# Patient Record
Sex: FEMALE | Race: WHITE | NOT HISPANIC OR LATINO | Employment: FULL TIME | URBAN - METROPOLITAN AREA
[De-identification: names, ages, dates, MRNs, and addresses within clinical notes are randomized per-mention and may not be internally consistent; named-entity substitution may affect disease eponyms.]

---

## 2023-03-10 ENCOUNTER — TELEPHONE (OUTPATIENT)
Dept: PRIMARY CARE | Facility: CLINIC | Age: 28
End: 2023-03-10
Payer: COMMERCIAL

## 2023-03-10 NOTE — TELEPHONE ENCOUNTER
Pt informed form was faxed to he dr.  Pt informed Dr performing procedure will know which atb she should receive.

## 2023-03-10 NOTE — TELEPHONE ENCOUNTER
Pt. States she dropped off a form on Wed. To be looked over and signed by Dr. MANN. She is having her wisdom teeth pulled and the dentist requires the PCP tell them what pt. Can take after the procedure. E.g. IBP  They also need it to say that pt. Is not allergic to Amox. Pt. States she told the nurse that she get gi upset when she takes Amox, and it got recorded in her chart that she allergic.

## 2023-10-20 PROBLEM — R76.8 HEPATITIS B CORE ANTIBODY POSITIVE: Status: ACTIVE | Noted: 2023-10-20

## 2023-10-20 PROBLEM — L02.92 BOIL: Status: RESOLVED | Noted: 2023-10-20 | Resolved: 2023-10-20

## 2023-10-20 PROBLEM — R53.83 FATIGUE: Status: ACTIVE | Noted: 2023-10-20

## 2023-10-20 PROBLEM — L70.0 ACNE VULGARIS: Status: ACTIVE | Noted: 2023-10-20

## 2023-10-20 PROBLEM — R10.9 ABDOMINAL PAIN: Status: RESOLVED | Noted: 2023-10-20 | Resolved: 2023-10-20

## 2023-10-20 PROBLEM — D64.9 ANEMIA: Status: ACTIVE | Noted: 2023-10-20

## 2023-10-20 PROBLEM — B18.2 CHRONIC HEPATITIS C WITHOUT HEPATIC COMA (MULTI): Status: ACTIVE | Noted: 2023-10-20

## 2023-10-20 PROBLEM — N92.1 BREAKTHROUGH BLEEDING WITH IUD: Status: ACTIVE | Noted: 2023-10-20

## 2023-10-20 PROBLEM — K92.1 HEMATOCHEZIA: Status: ACTIVE | Noted: 2023-10-20

## 2023-10-20 PROBLEM — K52.9 COLITIS: Status: RESOLVED | Noted: 2023-10-20 | Resolved: 2023-10-20

## 2023-10-20 PROBLEM — B19.10: Status: ACTIVE | Noted: 2023-10-20

## 2023-10-20 PROBLEM — T18.9XXA INGESTION OF FOREIGN MATERIAL: Status: RESOLVED | Noted: 2023-10-20 | Resolved: 2023-10-20

## 2023-10-20 PROBLEM — Z86.19 HEPATITIS C VIRUS INFECTION RESOLVED AFTER ANTIVIRAL DRUG THERAPY: Status: ACTIVE | Noted: 2023-10-20

## 2023-10-20 PROBLEM — Z97.5 BREAKTHROUGH BLEEDING WITH IUD: Status: ACTIVE | Noted: 2023-10-20

## 2023-10-20 PROBLEM — B18.1 CHRONIC VIRAL HEPATITIS B (MULTI): Status: ACTIVE | Noted: 2023-10-20

## 2023-10-20 PROBLEM — T18.9XXA FOREIGN BODY IN DIGESTIVE TRACT: Status: RESOLVED | Noted: 2023-10-20 | Resolved: 2023-10-20

## 2023-10-20 PROBLEM — N92.0 MENORRHAGIA: Status: ACTIVE | Noted: 2023-10-20

## 2023-10-20 RX ORDER — DICYCLOMINE HYDROCHLORIDE 10 MG/1
1 CAPSULE ORAL
COMMUNITY
Start: 2023-01-28

## 2023-10-20 RX ORDER — SPIRONOLACTONE 100 MG/1
100 TABLET, FILM COATED ORAL DAILY
COMMUNITY
End: 2023-10-24 | Stop reason: ALTCHOICE

## 2023-10-20 RX ORDER — MULTIVITAMIN
TABLET ORAL
COMMUNITY
Start: 2022-04-20

## 2023-10-20 RX ORDER — LEVONORGESTREL 52 MG/1
INTRAUTERINE DEVICE INTRAUTERINE
COMMUNITY
Start: 2022-04-13

## 2023-10-24 ENCOUNTER — OFFICE VISIT (OUTPATIENT)
Dept: PRIMARY CARE | Facility: CLINIC | Age: 28
End: 2023-10-24
Payer: COMMERCIAL

## 2023-10-24 VITALS
BODY MASS INDEX: 22.37 KG/M2 | OXYGEN SATURATION: 96 % | HEIGHT: 70 IN | SYSTOLIC BLOOD PRESSURE: 115 MMHG | DIASTOLIC BLOOD PRESSURE: 75 MMHG | WEIGHT: 156.25 LBS | TEMPERATURE: 98.1 F | HEART RATE: 76 BPM

## 2023-10-24 DIAGNOSIS — Q89.2 THYROGLOSSAL DUCT CYST: Primary | ICD-10-CM

## 2023-10-24 PROCEDURE — 99213 OFFICE O/P EST LOW 20 MIN: CPT | Performed by: FAMILY MEDICINE

## 2023-10-24 PROCEDURE — 1036F TOBACCO NON-USER: CPT | Performed by: FAMILY MEDICINE

## 2023-10-24 ASSESSMENT — ENCOUNTER SYMPTOMS: SORE THROAT: 0

## 2023-10-24 NOTE — PROGRESS NOTES
"Subjective   Patient ID: Lelo Arguelles is a 28 y.o. female who presents for small lump on the outside of her throat. States she noticed this in the pst but never thought much of it; since it seems to grow in size periodically pt wanted to have it examined.       On off in past   A little smaller   No painful           Review of Systems   HENT:  Negative for sore throat.        Objective   /75   Pulse 76   Temp 36.7 °C (98.1 °F)   Ht 1.778 m (5' 10\")   Wt 70.9 kg (156 lb 4 oz)   SpO2 96%   BMI 22.42 kg/m²     Physical Exam  Constitutional:       Appearance: Normal appearance.   Neck:      Comments: Thyroid midline not enlarged    Small 1 cm round soft nodule midline, inferior to the thyroid, easily mobile  Musculoskeletal:      Cervical back: Normal range of motion and neck supple. No tenderness.   Lymphadenopathy:      Cervical: No cervical adenopathy.   Neurological:      Mental Status: She is alert.   Psychiatric:         Mood and Affect: Mood normal.         Behavior: Behavior normal.         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

## 2024-03-05 ENCOUNTER — OFFICE VISIT (OUTPATIENT)
Dept: OTOLARYNGOLOGY | Facility: CLINIC | Age: 29
End: 2024-03-05
Payer: COMMERCIAL

## 2024-03-05 VITALS — BODY MASS INDEX: 22.48 KG/M2 | WEIGHT: 157 LBS | HEIGHT: 70 IN | TEMPERATURE: 97.1 F

## 2024-03-05 DIAGNOSIS — Q89.2 THYROGLOSSAL DUCT CYST: ICD-10-CM

## 2024-03-05 DIAGNOSIS — R22.1 NECK MASS: Primary | ICD-10-CM

## 2024-03-05 PROCEDURE — 99203 OFFICE O/P NEW LOW 30 MIN: CPT | Performed by: OTOLARYNGOLOGY

## 2024-03-05 PROCEDURE — 1036F TOBACCO NON-USER: CPT | Performed by: OTOLARYNGOLOGY

## 2024-03-05 ASSESSMENT — PATIENT HEALTH QUESTIONNAIRE - PHQ9
2. FEELING DOWN, DEPRESSED OR HOPELESS: NOT AT ALL
SUM OF ALL RESPONSES TO PHQ9 QUESTIONS 1 & 2: 0
1. LITTLE INTEREST OR PLEASURE IN DOING THINGS: NOT AT ALL

## 2024-03-06 NOTE — PROGRESS NOTES
BENNY Arguelles is a 29 y.o. female Michelle referred for midline neck mass which has been fluctuating in size from small like a BB to still less than a centimeter.  Nontender.  It is at its smallest now.  Present over approximately the inferior thyroid cartilage.  She has not had any other throat symptoms or pain.      Past Medical History:   Diagnosis Date    Abdominal pain 10/20/2023    Acute vaginitis 05/28/2021    Acute vaginitis    Anogenital (venereal) warts 05/28/2021    Condyloma acuminatum in female    Atypical squamous cells of undetermined significance on cytologic smear of cervix (ASC-US) 02/21/2022    ASCUS with positive high risk HPV cervical    Boil 10/20/2023    Chronic viral hepatitis B without delta-agent (CMS/HCC) 06/04/2021    Chronic viral hepatitis B    Contact with and (suspected) exposure to infections with a predominantly sexual mode of transmission 05/28/2021    Exposure to STD    Encounter for pregnancy test, result negative     Pregnancy examination or test, negative result    Foreign body in digestive tract 10/20/2023    Local infection of the skin and subcutaneous tissue, unspecified 03/25/2019    Staph skin infection    Melena 04/04/2019    Blood in the stool    Other diseases of tongue 03/25/2019    Mucocele of tongue    Other specified abnormal findings of blood chemistry 04/15/2021    Abnormal LFTs (liver function tests)    Other specified symptoms and signs involving the digestive system and abdomen 04/04/2019    Alternating constipation and diarrhea    Personal history of diseases of the skin and subcutaneous tissue 03/25/2019    History of acne    Personal history of other diseases of the digestive system 04/15/2021    History of colitis    Personal history of other diseases of the digestive system 10/01/2019    History of esophagitis    Personal history of other infectious and parasitic diseases 03/12/2021    History of trichomoniasis    Personal history of other specified  "conditions 03/25/2019    History of diarrhea            Medications:     Current Outpatient Medications:     dicyclomine (Bentyl) 10 mg capsule, Take 1 capsule (10 mg) by mouth every 6 hours during the day., Disp: , Rfl:     levonorgestrel (Mirena) 21 mcg/24 hours (8 yrs) 52 mg IUD, Mirena (52 MG) 20 MCG/DAY Intrauterine Intrauterine Device  Refills: 0      Start : 13-Apr-2022  Active, Disp: , Rfl:     multivitamin tablet, Multivitamin Oral Tablet  Refills: 0      Start : 20-Apr-2022  Active, Disp: , Rfl:      Allergies:  No Known Allergies     Physical Exam:  Last Recorded Vitals  Temperature 36.2 °C (97.1 °F), height 1.778 m (5' 10\"), weight 71.2 kg (157 lb).  General:     General appearance: Well-developed, well-nourished in no acute distress.       Voice:  normal       Head/face: Normal appearance; nontender to palpation     Facial nerve function: Normal and symmetric bilaterally.    Oral/oropharynx:     Oral vestibule: Normal labial and gingival mucosa     Tongue/floor of mouth: Normal without lesion     Oropharynx: Clear.  No lesions present of the hard/soft palate, posterior pharynx    Neck:     Neck: Normal appearance, trachea midline     Salivary glands: Normal to palpation bilaterally     Lymph nodes: Tiny mobile mass in the midline superior to the cricoid but not superior to the hyoid.     Thyroid: No thyromegaly.  No palpable nodules     Range of motion: Normal    Neurological:     Cortical functions: Alert and oriented x3, appropriate affect       Larynx/hypopharynx:     Laryngeal findings: Mirror exam inadequate or limited secondary to enlarged base of tongue and/or excessive gagging.  Flexible laryngoscopy performed secondary to inadequate mirror examination.  Verbal informed consent the flexible laryngoscope was passed through the nasal cavity.  Normal epiglottis, aryepiglottic folds, arytenoids, false vocal cords, true vocal cords.  Normal vocal cord mobility bilaterally was identified.  No mucosal " masses or lesions.    Nasopharynx:     Nasopharynx: Normal    Ear:     Ear canal: Normal bilaterally     Tympanic membrane: Intact and mobile bilaterally     Pinna: Normal bilaterally     Hearing:  Gross hearing assessment normal by voice    Nose:     Visualized using: Anterior rhinoscopy     Nasopharynx: Inadequate mirror exam secondary to gag, anatomy.       Nasal dorsum: Nontraumatic midline appearance     Septum: Midline     Inferior turbinates: Normally sized     Mucosa: Bilateral, pink, normal appearing       Assessment/Plan   This is a fairly inferior midline mass.  It has characteristics which suggest to me that it is a lymph node.  I recommended an ultrasound to better evaluate and we will follow-up after.  It is too small for needle biopsy.  We may consider removal or simply observation         Anibal Hughes MD

## 2024-06-13 ENCOUNTER — OFFICE VISIT (OUTPATIENT)
Dept: OBSTETRICS AND GYNECOLOGY | Facility: CLINIC | Age: 29
End: 2024-06-13
Payer: COMMERCIAL

## 2024-06-13 VITALS
WEIGHT: 154 LBS | DIASTOLIC BLOOD PRESSURE: 66 MMHG | SYSTOLIC BLOOD PRESSURE: 100 MMHG | BODY MASS INDEX: 22.05 KG/M2 | HEIGHT: 70 IN

## 2024-06-13 DIAGNOSIS — N94.10 DYSPAREUNIA IN FEMALE: Primary | ICD-10-CM

## 2024-06-13 DIAGNOSIS — N92.6 IRREGULAR MENSTRUAL CYCLE: ICD-10-CM

## 2024-06-13 PROCEDURE — 99213 OFFICE O/P EST LOW 20 MIN: CPT | Performed by: OBSTETRICS & GYNECOLOGY

## 2024-06-13 PROCEDURE — 1036F TOBACCO NON-USER: CPT | Performed by: OBSTETRICS & GYNECOLOGY

## 2024-06-13 RX ORDER — CLINDAMYCIN PHOSPHATE 10 UG/ML
LOTION TOPICAL
COMMUNITY
Start: 2024-05-16

## 2024-06-13 NOTE — PROGRESS NOTES
Subjective   Patient ID: Lelo Arguelles is a 29 y.o. female who presents for Menstrual Problem.  HPI  She has a Mirena IUD that was inserted 3/2022.  Pretty regular cycles for the most part. Recently getting them a little earlier and lasting a little longer though.  LMP end of May, spotting this week. Gone now.    Mostly made appointment today because of recent episode of pain.  2 nights ago she got pain after intercourse, tight cramping and sharp pain in the center of the abdomen and pelvis that lasted for a few hours. Was better yesterday and basically gone today.   No bleeding after intercourse.  Same partner for 3 years.    No bowel or bladder symptoms.     Review of Systems   Genitourinary:  Positive for dyspareunia and menstrual problem.   All other systems reviewed and are negative.    Objective   Physical Exam  Constitutional: Alert and in no acute distress.     Pulmonary: No respiratory distress.     Abdomen: Soft, nontender; no abdominal mass palpated.     Genitourinary:   External genitalia: Normal appearance.  No inguinal lymphadenopathy.   Bartholin's, Urethral, and Skenes Glands: Normal.   Urethra: Normal. Bladder: Normal on palpation.   Vagina: Normal. No blood or discharge.  Cervix: Normal appearance, nontender. IUD strings are visible and correct length.  Uterus/Adnexa: Normal size, mobile uterus. Nontender, no masses palpated in adnexa  Inspection of perianal area: Normal.    Psychiatric: Alert and oriented x 3. Affect normal to patient's baseline. Mood: Appropriate.    Assessment/Plan   Diagnoses and all orders for this visit:  Dyspareunia in female  Irregular menstrual cycle     Isolated recent episode of pain with intercourse.  Discussed possible causes including an issue with her IUD, other abnormality of the uterus, ovarian/tubal abnormality or cyst, infection.  Reassured there that her exam is normal today; no pain with palpation and IUD appears in correct position based on strings.  Offered  possibility of ultrasound to assess for any changes, but for now she would like to wait since the pain has only happened once and gone now. If it returns she will let me know.  Some irregularity of the menstrual cycle is normal with the IUD. Also offered to have her get lab work, but will also wait on this for now.    Jelly Casarez MD 06/13/24 2:45 PM

## 2025-06-17 ENCOUNTER — OFFICE VISIT (OUTPATIENT)
Dept: PRIMARY CARE | Facility: CLINIC | Age: 30
End: 2025-06-17
Payer: COMMERCIAL

## 2025-06-17 VITALS
DIASTOLIC BLOOD PRESSURE: 70 MMHG | SYSTOLIC BLOOD PRESSURE: 102 MMHG | OXYGEN SATURATION: 100 % | BODY MASS INDEX: 20.87 KG/M2 | HEIGHT: 70 IN | WEIGHT: 145.8 LBS | HEART RATE: 75 BPM

## 2025-06-17 DIAGNOSIS — L23.7 POISON IVY DERMATITIS: Primary | ICD-10-CM

## 2025-06-17 DIAGNOSIS — L98.9 SKIN LESION: ICD-10-CM

## 2025-06-17 PROCEDURE — 3008F BODY MASS INDEX DOCD: CPT | Performed by: NURSE PRACTITIONER

## 2025-06-17 PROCEDURE — 1036F TOBACCO NON-USER: CPT | Performed by: NURSE PRACTITIONER

## 2025-06-17 PROCEDURE — 99213 OFFICE O/P EST LOW 20 MIN: CPT | Performed by: NURSE PRACTITIONER

## 2025-06-17 RX ORDER — PREDNISONE 10 MG/1
TABLET ORAL
Qty: 20 TABLET | Refills: 0 | Status: SHIPPED | OUTPATIENT
Start: 2025-06-17 | End: 2025-06-25

## 2025-06-17 ASSESSMENT — ENCOUNTER SYMPTOMS
COUGH: 0
RHINORRHEA: 0
DIARRHEA: 0
SHORTNESS OF BREATH: 0
FEVER: 0
NAIL CHANGES: 0
FATIGUE: 0
ROS SKIN COMMENTS: SEE HPI
SORE THROAT: 0
VOMITING: 0
EYE PAIN: 0
ANOREXIA: 0

## 2025-06-17 ASSESSMENT — PATIENT HEALTH QUESTIONNAIRE - PHQ9
1. LITTLE INTEREST OR PLEASURE IN DOING THINGS: NOT AT ALL
2. FEELING DOWN, DEPRESSED OR HOPELESS: NOT AT ALL
SUM OF ALL RESPONSES TO PHQ9 QUESTIONS 1 AND 2: 0

## 2025-06-17 NOTE — PROGRESS NOTES
Subjective   Patient ID: Lelo Arguelles is a 30 y.o. female who presents for Poison Ivy (Pt states started 2 days ago. It is by right eye going down ear and neck and noticed bumps and reddish on left side of face by forehead.) and sore lump (States happens once  a month and it always on the lower tail bone area. States she has one right now on top of left butt cheek).    Patient seen today due to acute skin concerns.  Patient states that she has a rash secondary to poison ivy which developed 2 to 3 days ago.  She states that she noticed her puppy was walking through a patch of poison ivy several days ago and although the dog was bathed, she feels like she got exposed.  Rash to right side of patient's face and near her eyebrow.  No current drainage but discomfort reported.  Patient states that she also feels some lesions in her hair and is worried about it continuing to spread.  Also discussed with patient intermittent lesions to her buttocks area.  She voices concerns over recurrent lesion that is near her tailbone.  She states that these lesions appear approximately once a month will flare, takes several weeks to improve and then return.  Patient reports that this has been going on for approximately 1 year.  She does currently follow with dermatology but states that they have never been able to catch an active flare.  She denies any current infection concerns, fever, chills or other systemic issues.  She denies any new lotions, detergents or clothing.  Medications reviewed.  No other acute concerns voiced at this time.    Rash  This is a new problem. The current episode started in the past 7 days. The problem has been gradually worsening since onset. The affected locations include the scalp, head, face and left buttock. The rash is characterized by burning, redness and itchiness. It is plant contact and unknown if there was an exposure to a precipitant. She was exposed to plant contact and unknown. Pertinent  "negatives include no anorexia, congestion, cough, diarrhea, eye pain, facial edema, fatigue, fever, joint pain, nail changes, rhinorrhea, shortness of breath, sore throat or vomiting.   Poison Fernanda  Pertinent negatives include no anorexia, congestion, cough, diarrhea, eye pain, facial edema, fatigue, fever, joint pain, nail changes, rhinorrhea, shortness of breath, sore throat or vomiting.            Medications Ordered Prior to Encounter[1]    Medical History[2]     Surgical History[3]     Family History[4]     Review of Systems   Constitutional:  Negative for fatigue and fever.   HENT:  Negative for congestion, rhinorrhea and sore throat.    Eyes:  Negative for pain.   Respiratory:  Negative for cough and shortness of breath.    Gastrointestinal:  Negative for anorexia, diarrhea and vomiting.   Musculoskeletal:  Negative for joint pain.   Skin:  Positive for rash. Negative for nail changes.        See HPI       Objective   /70 (BP Location: Left arm, Patient Position: Standing)   Pulse 75   Ht 1.778 m (5' 10\")   Wt 66.1 kg (145 lb 12.8 oz)   SpO2 100%   BMI 20.92 kg/m²     Physical Exam  Constitutional:       General: She is not in acute distress.     Appearance: Normal appearance. She is not toxic-appearing.   HENT:      Head: Normocephalic and atraumatic.      Right Ear: External ear normal.      Left Ear: External ear normal.      Nose: Nose normal.      Mouth/Throat:      Mouth: Mucous membranes are moist.      Pharynx: Oropharynx is clear.   Eyes:      Extraocular Movements: Extraocular movements intact.      Conjunctiva/sclera: Conjunctivae normal.   Pulmonary:      Effort: Pulmonary effort is normal.   Musculoskeletal:         General: No swelling.      Cervical back: Normal range of motion and neck supple.   Skin:     General: Skin is warm and dry.             Comments: Poison ivy dermatitis near patient's right ear and above left eyebrow.  No ocular involvement.  No current drainage or warmth.  " Erythema present.  Small clustered, vesicular patch just above patient's gluteal cleft.  No current drainage, warmth or infection concerns.   Neurological:      General: No focal deficit present.      Mental Status: She is alert and oriented to person, place, and time. Mental status is at baseline.      Cranial Nerves: No cranial nerve deficit.      Motor: No weakness.   Psychiatric:         Mood and Affect: Mood normal.         Behavior: Behavior normal.         Thought Content: Thought content normal.         Judgment: Judgment normal.         Assessment/Plan   Problem List Items Addressed This Visit    None  Visit Diagnoses         Codes      Poison ivy dermatitis    -  Primary L23.7    Relevant Medications    predniSONE (Deltasone) 10 mg tablet      Skin lesion     L98.9          Initiate course of oral steroids due to worsening poison ivy concerns.  Good hand hygiene encouraged along with patient changing out all of her linens and avoiding dog sleeping in bed with her.  Provider to be notified for any persistent/worsening rash concerns.  Unknown etiology of skin concerns to buttocks.  Recurrent in nature.  HSV?  Dermatology follow-up in place for next week.  Encourage patient to take pictures for dermatologist to review for additional evaluation and treatment recommendations.  Patient in agreement to plan of care and had no additional questions/concerns at this time            [1]   Current Outpatient Medications on File Prior to Visit   Medication Sig Dispense Refill    levonorgestrel (Mirena) 21 mcg/24 hours (8 yrs) 52 mg IUD Mirena (52 MG) 20 MCG/DAY Intrauterine Intrauterine Device   Refills: 0        Start : 13-Apr-2022   Active      multivitamin tablet Multivitamin Oral Tablet   Refills: 0        Start : 20-Apr-2022   Active      [DISCONTINUED] clindamycin (Cleocin T) 1 % lotion APPLY TOPICALLY TO FACE ONCE DAILY IN THE MORNING.      [DISCONTINUED] dicyclomine (Bentyl) 10 mg capsule Take 1 capsule (10 mg)  by mouth every 6 hours during the day.       No current facility-administered medications on file prior to visit.   [2]   Past Medical History:  Diagnosis Date    Abdominal pain 10/20/2023    Acute vaginitis 05/28/2021    Acute vaginitis    Anogenital (venereal) warts 05/28/2021    Condyloma acuminatum in female    Atypical squamous cells of undetermined significance on cytologic smear of cervix (ASC-US) 02/21/2022    ASCUS with positive high risk HPV cervical    Boil 10/20/2023    Chronic viral hepatitis B without delta-agent (Multi) 06/04/2021    Chronic viral hepatitis B    Contact with and (suspected) exposure to infections with a predominantly sexual mode of transmission 05/28/2021    Exposure to STD    Encounter for pregnancy test, result negative     Pregnancy examination or test, negative result    Foreign body in digestive tract 10/20/2023    Local infection of the skin and subcutaneous tissue, unspecified 03/25/2019    Staph skin infection    Melena 04/04/2019    Blood in the stool    Other diseases of tongue 03/25/2019    Mucocele of tongue    Other specified abnormal findings of blood chemistry 04/15/2021    Abnormal LFTs (liver function tests)    Other specified symptoms and signs involving the digestive system and abdomen 04/04/2019    Alternating constipation and diarrhea    Personal history of diseases of the skin and subcutaneous tissue 03/25/2019    History of acne    Personal history of other diseases of the digestive system 04/15/2021    History of colitis    Personal history of other diseases of the digestive system 10/01/2019    History of esophagitis    Personal history of other infectious and parasitic diseases 03/12/2021    History of trichomoniasis    Personal history of other specified conditions 03/25/2019    History of diarrhea   [3] History reviewed. No pertinent surgical history.  [4] No family history on file.

## 2025-06-27 ENCOUNTER — TELEPHONE (OUTPATIENT)
Dept: PRIMARY CARE | Facility: CLINIC | Age: 30
End: 2025-06-27
Payer: COMMERCIAL

## 2025-06-27 NOTE — TELEPHONE ENCOUNTER
Pt states rash was fading, since finishing the oral steroid the rash is returning.  It is showing no on her neck, the back of her head, and the sides of her face.  Should she have another round of prednisone?  Her pharmacy was confirmed as [facility].

## 2025-06-28 DIAGNOSIS — L23.7 POISON IVY DERMATITIS: Primary | ICD-10-CM

## 2025-06-28 RX ORDER — PREDNISONE 10 MG/1
10 TABLET ORAL 2 TIMES DAILY
Qty: 10 TABLET | Refills: 0 | Status: SHIPPED | OUTPATIENT
Start: 2025-06-28 | End: 2025-07-03

## 2025-06-28 NOTE — TELEPHONE ENCOUNTER
Selam Quinn MD to Me  (Selected Message)        6/28/25 10:49 AM  I would only suggest doing just a few more days of prednisone, if the rash is not getting better or if it persists you need to follow-up in the office  Shanna Portillo MA to Selam Quinn MD         6/27/25  5:08 PM  Pt was seen on 6/17/25 6/27/25  3:08 PM  Rashida Sheehan routed this conversation to [facility] [address] Clinical Support Staff     6/27/25  3:08 PM  Lelo Arguelles contacted Rashida Sheehan      6/27/25  3:08 PM  Note     Pt states rash was fading, since finishing the oral steroid the rash is returning.  It is showing no on her neck, the back of her head, and the sides of her face.  Should she have another round of prednisone?  Her pharmacy was confirmed as [facility].      Spoke with pt. [place]